# Patient Record
Sex: MALE | Race: WHITE | Employment: FULL TIME | URBAN - METROPOLITAN AREA
[De-identification: names, ages, dates, MRNs, and addresses within clinical notes are randomized per-mention and may not be internally consistent; named-entity substitution may affect disease eponyms.]

---

## 2019-04-07 ENCOUNTER — HOSPITAL ENCOUNTER (EMERGENCY)
Age: 44
Discharge: HOME | End: 2019-04-07
Attending: NURSE PRACTITIONER
Payer: COMMERCIAL

## 2019-04-07 VITALS
WEIGHT: 172.4 LBS | SYSTOLIC BLOOD PRESSURE: 132 MMHG | OXYGEN SATURATION: 97 % | HEIGHT: 69 IN | RESPIRATION RATE: 12 BRPM | TEMPERATURE: 97.5 F | HEART RATE: 78 BPM | DIASTOLIC BLOOD PRESSURE: 71 MMHG | BODY MASS INDEX: 25.53 KG/M2

## 2019-04-07 DIAGNOSIS — L02.212 CUTANEOUS ABSCESS OF BACK EXCLUDING BUTTOCKS: Primary | ICD-10-CM

## 2019-04-07 PROCEDURE — 87205 SMEAR GRAM STAIN: CPT

## 2019-04-07 PROCEDURE — 74011000250 HC RX REV CODE- 250: Performed by: NURSE PRACTITIONER

## 2019-04-07 PROCEDURE — 75810000289 HC I&D ABSCESS SIMP/COMP/MULT

## 2019-04-07 PROCEDURE — 99282 EMERGENCY DEPT VISIT SF MDM: CPT

## 2019-04-07 RX ORDER — CEPHALEXIN 500 MG/1
500 CAPSULE ORAL 4 TIMES DAILY
Qty: 28 CAP | Refills: 0 | Status: SHIPPED | OUTPATIENT
Start: 2019-04-07 | End: 2019-04-14

## 2019-04-07 RX ORDER — LIDOCAINE HYDROCHLORIDE AND EPINEPHRINE 10; 10 MG/ML; UG/ML
1.5 INJECTION, SOLUTION INFILTRATION; PERINEURAL ONCE
Status: COMPLETED | OUTPATIENT
Start: 2019-04-07 | End: 2019-04-07

## 2019-04-07 RX ADMIN — LIDOCAINE HYDROCHLORIDE,EPINEPHRINE BITARTRATE 15 MG: 10; .01 INJECTION, SOLUTION INFILTRATION; PERINEURAL at 11:12

## 2019-04-07 NOTE — ED PROVIDER NOTES
Presents with right flank abscess or \"lipoma\" that has been present for several months, but became inflamed and infected after hiking. He believes his belt is irritating the abscess and making things worse. He denies any medical history. History reviewed. No pertinent past medical history. History reviewed. No pertinent surgical history. History reviewed. No pertinent family history. Social History Socioeconomic History  Marital status:  Spouse name: Not on file  Number of children: Not on file  Years of education: Not on file  Highest education level: Not on file Occupational History  Not on file Social Needs  Financial resource strain: Not on file  Food insecurity:  
  Worry: Not on file Inability: Not on file  Transportation needs:  
  Medical: Not on file Non-medical: Not on file Tobacco Use  Smoking status: Never Smoker  Smokeless tobacco: Never Used Substance and Sexual Activity  Alcohol use: Yes Alcohol/week: 5.0 oz Types: 10 Cans of beer per week  Drug use: No  
 Sexual activity: Not on file Lifestyle  Physical activity:  
  Days per week: Not on file Minutes per session: Not on file  Stress: Not on file Relationships  Social connections:  
  Talks on phone: Not on file Gets together: Not on file Attends Rastafarian service: Not on file Active member of club or organization: Not on file Attends meetings of clubs or organizations: Not on file Relationship status: Not on file  Intimate partner violence:  
  Fear of current or ex partner: Not on file Emotionally abused: Not on file Physically abused: Not on file Forced sexual activity: Not on file Other Topics Concern  Not on file Social History Narrative  Not on file ALLERGIES: Ampicillin Review of Systems Constitutional: Negative for chills and fever. HENT: Negative. Respiratory: Negative. Cardiovascular: Negative. Skin: Positive for wound. Neurological: Negative. Vitals:  
 04/07/19 1047 BP: 132/71 Pulse: 78 Resp: 12 Temp: 97.5 °F (36.4 °C) SpO2: 97% Weight: 78.2 kg (172 lb 6.4 oz) Height: 5' 9\" (1.753 m) Physical Exam  
Constitutional: He is oriented to person, place, and time. He appears well-developed and well-nourished. No distress. HENT:  
Head: Normocephalic and atraumatic. Eyes: Pupils are equal, round, and reactive to light. EOM are normal.  
Neck: Normal range of motion. Pulmonary/Chest: Effort normal.  
Musculoskeletal: Normal range of motion. Neurological: He is alert and oriented to person, place, and time. Coordination normal.  
Skin: Skin is warm. Lesion noted. He is not diaphoretic. There is erythema. 2.5 x 2.5 tender, homogenous subcutaneous mass at the flank at the belt line. No fistula. Nursing note and vitals reviewed. MDM Number of Diagnoses or Management Options Cutaneous abscess of back excluding buttocks:  
Diagnosis management comments:  
2.5 x 2.5 inflamed mass at the right flank. Successful incision and drainage without complications. Copious amounts of sebum and mixed transudate evacuated. Most of cystic lining was completely excised. Will follow culture results. Follow up with PCP in 2 days for packing removal.  
 
  
Amount and/or Complexity of Data Reviewed Clinical lab tests: ordered Risk of Complications, Morbidity, and/or Mortality Presenting problems: minimal 
Diagnostic procedures: minimal 
Management options: minimal 
 
 
  
 
I&D Abcess Simple Date/Time: 4/7/2019 12:01 PM 
Performed by: Carly Denney NP Authorized by: Carly Denney NP Consent:  
  Consent obtained:  Verbal and written Consent given by:  Patient Risks discussed:  Bleeding, incomplete drainage, infection and pain Alternatives discussed:  No treatment and alternative treatment Location:  
  Type:  Abscess Size:  2.5 x 2.5 Location:  Trunk Trunk location:  Back Pre-procedure details:  
  Skin preparation:  Betadine Procedure type:  
  Complexity:  Simple Procedure details:  
  Incision types:  Single straight Incision depth:  Subcutaneous Scalpel blade:  11 Wound management:  Probed and deloculated and debrided Drainage:  Serosanguinous and purulent Drainage amount:  Copious Packing materials:  1/2 in gauze

## 2019-04-07 NOTE — ED TRIAGE NOTES
Patient ambulatory to ED treatment area with steady gait, for complaint of \"I have fatty area on my back and last week I noticed that I the area is red and painful. \" Denies fever at home. Unsure of injury to the area.

## 2019-04-09 LAB
BACTERIA SPEC CULT: NORMAL
GRAM STN SPEC: NORMAL
GRAM STN SPEC: NORMAL
SERVICE CMNT-IMP: NORMAL

## 2020-09-02 ENCOUNTER — HOSPITAL ENCOUNTER (INPATIENT)
Age: 45
LOS: 1 days | Discharge: HOME | DRG: 918 | End: 2020-09-04
Attending: STUDENT IN AN ORGANIZED HEALTH CARE EDUCATION/TRAINING PROGRAM | Admitting: INTERNAL MEDICINE
Payer: COMMERCIAL

## 2020-09-02 DIAGNOSIS — T63.001A TOXIC EFFECT OF SNAKE VENOM, UNINTENTIONAL, INITIAL ENCOUNTER: Primary | ICD-10-CM

## 2020-09-02 LAB
ALBUMIN SERPL-MCNC: 4.1 G/DL (ref 3.5–5)
ALBUMIN/GLOB SERPL: 1 {RATIO} (ref 1.1–2.2)
ALP SERPL-CCNC: 99 U/L (ref 45–117)
ALT SERPL-CCNC: 25 U/L (ref 12–78)
ANION GAP SERPL CALC-SCNC: 12 MMOL/L (ref 5–15)
AST SERPL-CCNC: 19 U/L (ref 15–37)
BASOPHILS # BLD: 0 K/UL (ref 0–0.1)
BASOPHILS NFR BLD: 0 % (ref 0–1)
BILIRUB SERPL-MCNC: 0.3 MG/DL (ref 0.2–1)
BUN SERPL-MCNC: 17 MG/DL (ref 6–20)
BUN/CREAT SERPL: 16 (ref 12–20)
CALCIUM SERPL-MCNC: 9.6 MG/DL (ref 8.5–10.1)
CHLORIDE SERPL-SCNC: 103 MMOL/L (ref 97–108)
CK SERPL-CCNC: 102 U/L (ref 39–308)
CO2 SERPL-SCNC: 26 MMOL/L (ref 21–32)
CREAT SERPL-MCNC: 1.04 MG/DL (ref 0.7–1.3)
DIFFERENTIAL METHOD BLD: ABNORMAL
EOSINOPHIL # BLD: 2.6 K/UL (ref 0–0.4)
EOSINOPHIL NFR BLD: 20 % (ref 0–7)
ERYTHROCYTE [DISTWIDTH] IN BLOOD BY AUTOMATED COUNT: 12.5 % (ref 11.5–14.5)
GLOBULIN SER CALC-MCNC: 4.2 G/DL (ref 2–4)
GLUCOSE SERPL-MCNC: 90 MG/DL (ref 65–100)
HCT VFR BLD AUTO: 47.7 % (ref 36.6–50.3)
HGB BLD-MCNC: 15.8 G/DL (ref 12.1–17)
IMM GRANULOCYTES # BLD AUTO: 0 K/UL
IMM GRANULOCYTES NFR BLD AUTO: 0 %
INR PPP: 1.1 (ref 0.9–1.1)
LYMPHOCYTES # BLD: 3.1 K/UL (ref 0.8–3.5)
LYMPHOCYTES NFR BLD: 24 % (ref 12–49)
MCH RBC QN AUTO: 31.3 PG (ref 26–34)
MCHC RBC AUTO-ENTMCNC: 33.1 G/DL (ref 30–36.5)
MCV RBC AUTO: 94.5 FL (ref 80–99)
MONOCYTES # BLD: 0.8 K/UL (ref 0–1)
MONOCYTES NFR BLD: 6 % (ref 5–13)
NEUTS SEG # BLD: 6.3 K/UL (ref 1.8–8)
NEUTS SEG NFR BLD: 50 % (ref 32–75)
NRBC # BLD: 0 K/UL (ref 0–0.01)
NRBC BLD-RTO: 0 PER 100 WBC
PLATELET # BLD AUTO: 316 K/UL (ref 150–400)
PLATELET COMMENTS,PCOM: ABNORMAL
PMV BLD AUTO: 10.2 FL (ref 8.9–12.9)
POTASSIUM SERPL-SCNC: 4 MMOL/L (ref 3.5–5.1)
PROT SERPL-MCNC: 8.3 G/DL (ref 6.4–8.2)
PROTHROMBIN TIME: 10.4 SEC (ref 9–11.1)
RBC # BLD AUTO: 5.05 M/UL (ref 4.1–5.7)
RBC MORPH BLD: ABNORMAL
SODIUM SERPL-SCNC: 141 MMOL/L (ref 136–145)
WBC # BLD AUTO: 12.8 K/UL (ref 4.1–11.1)
WBC MORPH BLD: ABNORMAL

## 2020-09-02 PROCEDURE — 85610 PROTHROMBIN TIME: CPT

## 2020-09-02 PROCEDURE — 93005 ELECTROCARDIOGRAM TRACING: CPT

## 2020-09-02 PROCEDURE — 85025 COMPLETE CBC W/AUTO DIFF WBC: CPT

## 2020-09-02 PROCEDURE — 82550 ASSAY OF CK (CPK): CPT

## 2020-09-02 PROCEDURE — 85384 FIBRINOGEN ACTIVITY: CPT

## 2020-09-02 PROCEDURE — 80053 COMPREHEN METABOLIC PANEL: CPT

## 2020-09-02 PROCEDURE — 90715 TDAP VACCINE 7 YRS/> IM: CPT | Performed by: STUDENT IN AN ORGANIZED HEALTH CARE EDUCATION/TRAINING PROGRAM

## 2020-09-02 PROCEDURE — 74011250636 HC RX REV CODE- 250/636: Performed by: STUDENT IN AN ORGANIZED HEALTH CARE EDUCATION/TRAINING PROGRAM

## 2020-09-02 PROCEDURE — 36415 COLL VENOUS BLD VENIPUNCTURE: CPT

## 2020-09-02 RX ORDER — MORPHINE SULFATE 4 MG/ML
6 INJECTION INTRAVENOUS ONCE
Status: COMPLETED | OUTPATIENT
Start: 2020-09-02 | End: 2020-09-02

## 2020-09-02 RX ORDER — MORPHINE SULFATE 2 MG/ML
4 INJECTION, SOLUTION INTRAMUSCULAR; INTRAVENOUS ONCE
Status: COMPLETED | OUTPATIENT
Start: 2020-09-02 | End: 2020-09-02

## 2020-09-02 RX ORDER — DIPHENHYDRAMINE HYDROCHLORIDE 50 MG/ML
50 INJECTION, SOLUTION INTRAMUSCULAR; INTRAVENOUS ONCE
Status: COMPLETED | OUTPATIENT
Start: 2020-09-02 | End: 2020-09-02

## 2020-09-02 RX ADMIN — MORPHINE SULFATE 6 MG: 4 INJECTION INTRAVENOUS at 23:11

## 2020-09-02 RX ADMIN — MORPHINE SULFATE 4 MG: 2 INJECTION, SOLUTION INTRAMUSCULAR; INTRAVENOUS at 23:45

## 2020-09-02 RX ADMIN — OVINE CROTALIDAE VENOMS IMMUNE FAB 6 VIAL: 1; 1; 1; 1 INJECTION, POWDER, LYOPHILIZED, FOR SOLUTION INTRAVENOUS at 23:33

## 2020-09-02 RX ADMIN — SODIUM CHLORIDE, SODIUM LACTATE, POTASSIUM CHLORIDE, AND CALCIUM CHLORIDE 1000 ML: 600; 310; 30; 20 INJECTION, SOLUTION INTRAVENOUS at 23:35

## 2020-09-02 RX ADMIN — DIPHENHYDRAMINE HYDROCHLORIDE 50 MG: 50 INJECTION, SOLUTION INTRAMUSCULAR; INTRAVENOUS at 23:43

## 2020-09-02 RX ADMIN — TETANUS TOXOID, REDUCED DIPHTHERIA TOXOID AND ACELLULAR PERTUSSIS VACCINE, ADSORBED 0.5 ML: 5; 2.5; 8; 8; 2.5 SUSPENSION INTRAMUSCULAR at 23:53
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
## 2020-09-03 LAB
ANION GAP SERPL CALC-SCNC: 5 MMOL/L (ref 5–15)
BASOPHILS # BLD: 0 K/UL (ref 0–0.1)
BASOPHILS # BLD: 0 K/UL (ref 0–0.1)
BASOPHILS NFR BLD: 0 % (ref 0–1)
BASOPHILS NFR BLD: 0 % (ref 0–1)
BUN SERPL-MCNC: 17 MG/DL (ref 6–20)
BUN/CREAT SERPL: 19 (ref 12–20)
CALCIUM SERPL-MCNC: 8.6 MG/DL (ref 8.5–10.1)
CHLORIDE SERPL-SCNC: 107 MMOL/L (ref 97–108)
CO2 SERPL-SCNC: 25 MMOL/L (ref 21–32)
CREAT SERPL-MCNC: 0.91 MG/DL (ref 0.7–1.3)
DIFFERENTIAL METHOD BLD: ABNORMAL
DIFFERENTIAL METHOD BLD: ABNORMAL
EOSINOPHIL # BLD: 0 K/UL (ref 0–0.4)
EOSINOPHIL # BLD: 0.4 K/UL (ref 0–0.4)
EOSINOPHIL NFR BLD: 0 % (ref 0–7)
EOSINOPHIL NFR BLD: 4 % (ref 0–7)
ERYTHROCYTE [DISTWIDTH] IN BLOOD BY AUTOMATED COUNT: 12.4 % (ref 11.5–14.5)
ERYTHROCYTE [DISTWIDTH] IN BLOOD BY AUTOMATED COUNT: 12.5 % (ref 11.5–14.5)
FIBRINOGEN PPP-MCNC: 346 MG/DL (ref 200–475)
GLUCOSE SERPL-MCNC: 127 MG/DL (ref 65–100)
HCT VFR BLD AUTO: 46.1 % (ref 36.6–50.3)
HCT VFR BLD AUTO: 48.1 % (ref 36.6–50.3)
HGB BLD-MCNC: 15.7 G/DL (ref 12.1–17)
HGB BLD-MCNC: 16.2 G/DL (ref 12.1–17)
IMM GRANULOCYTES # BLD AUTO: 0 K/UL
IMM GRANULOCYTES # BLD AUTO: 0.1 K/UL (ref 0–0.04)
IMM GRANULOCYTES NFR BLD AUTO: 0 %
IMM GRANULOCYTES NFR BLD AUTO: 1 % (ref 0–0.5)
INR PPP: 1.2 (ref 0.9–1.1)
LYMPHOCYTES # BLD: 0.5 K/UL (ref 0.8–3.5)
LYMPHOCYTES # BLD: 2.8 K/UL (ref 0.8–3.5)
LYMPHOCYTES NFR BLD: 26 % (ref 12–49)
LYMPHOCYTES NFR BLD: 3 % (ref 12–49)
MCH RBC QN AUTO: 31.5 PG (ref 26–34)
MCH RBC QN AUTO: 32 PG (ref 26–34)
MCHC RBC AUTO-ENTMCNC: 33.7 G/DL (ref 30–36.5)
MCHC RBC AUTO-ENTMCNC: 34.1 G/DL (ref 30–36.5)
MCV RBC AUTO: 93.4 FL (ref 80–99)
MCV RBC AUTO: 93.9 FL (ref 80–99)
MONOCYTES # BLD: 0.3 K/UL (ref 0–1)
MONOCYTES # BLD: 0.5 K/UL (ref 0–1)
MONOCYTES NFR BLD: 2 % (ref 5–13)
MONOCYTES NFR BLD: 5 % (ref 5–13)
NEUTS BAND NFR BLD MANUAL: 9 % (ref 0–6)
NEUTS SEG # BLD: 15.3 K/UL (ref 1.8–8)
NEUTS SEG # BLD: 7.2 K/UL (ref 1.8–8)
NEUTS SEG NFR BLD: 65 % (ref 32–75)
NEUTS SEG NFR BLD: 86 % (ref 32–75)
NRBC # BLD: 0 K/UL (ref 0–0.01)
NRBC # BLD: 0 K/UL (ref 0–0.01)
NRBC BLD-RTO: 0 PER 100 WBC
NRBC BLD-RTO: 0 PER 100 WBC
PLATELET # BLD AUTO: 250 K/UL (ref 150–400)
PLATELET # BLD AUTO: 299 K/UL (ref 150–400)
PMV BLD AUTO: 10 FL (ref 8.9–12.9)
PMV BLD AUTO: 9.6 FL (ref 8.9–12.9)
POTASSIUM SERPL-SCNC: 4.4 MMOL/L (ref 3.5–5.1)
PROTHROMBIN TIME: 11.6 SEC (ref 9–11.1)
RBC # BLD AUTO: 4.91 M/UL (ref 4.1–5.7)
RBC # BLD AUTO: 5.15 M/UL (ref 4.1–5.7)
RBC MORPH BLD: ABNORMAL
SODIUM SERPL-SCNC: 137 MMOL/L (ref 136–145)
WBC # BLD AUTO: 11.1 K/UL (ref 4.1–11.1)
WBC # BLD AUTO: 16.1 K/UL (ref 4.1–11.1)

## 2020-09-03 PROCEDURE — 36415 COLL VENOUS BLD VENIPUNCTURE: CPT

## 2020-09-03 PROCEDURE — 74011000250 HC RX REV CODE- 250: Performed by: STUDENT IN AN ORGANIZED HEALTH CARE EDUCATION/TRAINING PROGRAM

## 2020-09-03 PROCEDURE — 96365 THER/PROPH/DIAG IV INF INIT: CPT

## 2020-09-03 PROCEDURE — 85025 COMPLETE CBC W/AUTO DIFF WBC: CPT

## 2020-09-03 PROCEDURE — 74011250636 HC RX REV CODE- 250/636: Performed by: STUDENT IN AN ORGANIZED HEALTH CARE EDUCATION/TRAINING PROGRAM

## 2020-09-03 PROCEDURE — 96361 HYDRATE IV INFUSION ADD-ON: CPT

## 2020-09-03 PROCEDURE — 96374 THER/PROPH/DIAG INJ IV PUSH: CPT

## 2020-09-03 PROCEDURE — 96375 TX/PRO/DX INJ NEW DRUG ADDON: CPT

## 2020-09-03 PROCEDURE — 74011250636 HC RX REV CODE- 250/636

## 2020-09-03 PROCEDURE — 74011250636 HC RX REV CODE- 250/636: Performed by: INTERNAL MEDICINE

## 2020-09-03 PROCEDURE — 65270000029 HC RM PRIVATE

## 2020-09-03 PROCEDURE — 74011250637 HC RX REV CODE- 250/637: Performed by: INTERNAL MEDICINE

## 2020-09-03 PROCEDURE — 74011000258 HC RX REV CODE- 258: Performed by: INTERNAL MEDICINE

## 2020-09-03 PROCEDURE — 99285 EMERGENCY DEPT VISIT HI MDM: CPT

## 2020-09-03 PROCEDURE — 85610 PROTHROMBIN TIME: CPT

## 2020-09-03 PROCEDURE — 90471 IMMUNIZATION ADMIN: CPT

## 2020-09-03 PROCEDURE — 96376 TX/PRO/DX INJ SAME DRUG ADON: CPT

## 2020-09-03 PROCEDURE — 74011250636 HC RX REV CODE- 250/636: Performed by: EMERGENCY MEDICINE

## 2020-09-03 PROCEDURE — 80048 BASIC METABOLIC PNL TOTAL CA: CPT

## 2020-09-03 RX ORDER — DIPHENHYDRAMINE HYDROCHLORIDE 50 MG/ML
50 INJECTION, SOLUTION INTRAMUSCULAR; INTRAVENOUS
Status: DISCONTINUED | OUTPATIENT
Start: 2020-09-03 | End: 2020-09-04 | Stop reason: HOSPADM

## 2020-09-03 RX ORDER — DEXTROSE MONOHYDRATE AND SODIUM CHLORIDE 5; .45 G/100ML; G/100ML
75 INJECTION, SOLUTION INTRAVENOUS CONTINUOUS
Status: DISCONTINUED | OUTPATIENT
Start: 2020-09-03 | End: 2020-09-03

## 2020-09-03 RX ORDER — MORPHINE SULFATE 2 MG/ML
2 INJECTION, SOLUTION INTRAMUSCULAR; INTRAVENOUS
Status: DISCONTINUED | OUTPATIENT
Start: 2020-09-03 | End: 2020-09-03

## 2020-09-03 RX ORDER — SODIUM CHLORIDE 0.9 % (FLUSH) 0.9 %
5-40 SYRINGE (ML) INJECTION AS NEEDED
Status: DISCONTINUED | OUTPATIENT
Start: 2020-09-03 | End: 2020-09-04 | Stop reason: HOSPADM

## 2020-09-03 RX ORDER — EPINEPHRINE 1 MG/ML
INJECTION, SOLUTION, CONCENTRATE INTRAVENOUS
Status: COMPLETED
Start: 2020-09-03 | End: 2020-09-03

## 2020-09-03 RX ORDER — SODIUM CHLORIDE 0.9 % (FLUSH) 0.9 %
5-40 SYRINGE (ML) INJECTION EVERY 8 HOURS
Status: DISCONTINUED | OUTPATIENT
Start: 2020-09-03 | End: 2020-09-04 | Stop reason: HOSPADM

## 2020-09-03 RX ORDER — ACETAMINOPHEN 325 MG/1
650 TABLET ORAL
Status: DISCONTINUED | OUTPATIENT
Start: 2020-09-03 | End: 2020-09-04 | Stop reason: HOSPADM

## 2020-09-03 RX ORDER — ZOLPIDEM TARTRATE 5 MG/1
5 TABLET ORAL
Status: DISCONTINUED | OUTPATIENT
Start: 2020-09-03 | End: 2020-09-04 | Stop reason: HOSPADM

## 2020-09-03 RX ORDER — CLINDAMYCIN PHOSPHATE 10 MG/G
GEL TOPICAL DAILY
COMMUNITY

## 2020-09-03 RX ORDER — EPINEPHRINE 1 MG/ML
0.5 INJECTION, SOLUTION, CONCENTRATE INTRAVENOUS
Status: COMPLETED | OUTPATIENT
Start: 2020-09-03 | End: 2020-09-03

## 2020-09-03 RX ORDER — PROMETHAZINE HYDROCHLORIDE 25 MG/1
12.5 TABLET ORAL
Status: DISCONTINUED | OUTPATIENT
Start: 2020-09-03 | End: 2020-09-04 | Stop reason: HOSPADM

## 2020-09-03 RX ORDER — HYDROMORPHONE HYDROCHLORIDE 1 MG/ML
1 INJECTION, SOLUTION INTRAMUSCULAR; INTRAVENOUS; SUBCUTANEOUS
Status: COMPLETED | OUTPATIENT
Start: 2020-09-03 | End: 2020-09-03

## 2020-09-03 RX ORDER — ONDANSETRON 2 MG/ML
4 INJECTION INTRAMUSCULAR; INTRAVENOUS
Status: DISCONTINUED | OUTPATIENT
Start: 2020-09-03 | End: 2020-09-04 | Stop reason: HOSPADM

## 2020-09-03 RX ORDER — POLYETHYLENE GLYCOL 3350 17 G/17G
17 POWDER, FOR SOLUTION ORAL DAILY PRN
Status: DISCONTINUED | OUTPATIENT
Start: 2020-09-03 | End: 2020-09-04 | Stop reason: HOSPADM

## 2020-09-03 RX ORDER — HYDROMORPHONE HYDROCHLORIDE 2 MG/ML
2 INJECTION, SOLUTION INTRAMUSCULAR; INTRAVENOUS; SUBCUTANEOUS
Status: DISCONTINUED | OUTPATIENT
Start: 2020-09-03 | End: 2020-09-04 | Stop reason: HOSPADM

## 2020-09-03 RX ORDER — ACETAMINOPHEN 650 MG/1
650 SUPPOSITORY RECTAL
Status: DISCONTINUED | OUTPATIENT
Start: 2020-09-03 | End: 2020-09-04 | Stop reason: HOSPADM

## 2020-09-03 RX ADMIN — DEXTROSE MONOHYDRATE AND SODIUM CHLORIDE 75 ML/HR: 5; .45 INJECTION, SOLUTION INTRAVENOUS at 02:59

## 2020-09-03 RX ADMIN — Medication 10 ML: at 06:33

## 2020-09-03 RX ADMIN — ZOLPIDEM TARTRATE 5 MG: 5 TABLET ORAL at 23:59

## 2020-09-03 RX ADMIN — EPINEPHRINE 1 MG: 1 INJECTION, SOLUTION, CONCENTRATE INTRAVENOUS at 00:39

## 2020-09-03 RX ADMIN — Medication 40 ML: at 11:00

## 2020-09-03 RX ADMIN — HYDROMORPHONE HYDROCHLORIDE 2 MG: 2 INJECTION, SOLUTION INTRAMUSCULAR; INTRAVENOUS; SUBCUTANEOUS at 06:33

## 2020-09-03 RX ADMIN — METHYLPREDNISOLONE SODIUM SUCCINATE 125 MG: 125 INJECTION, POWDER, FOR SOLUTION INTRAMUSCULAR; INTRAVENOUS at 00:49

## 2020-09-03 RX ADMIN — FAMOTIDINE 20 MG: 10 INJECTION, SOLUTION INTRAVENOUS at 00:30

## 2020-09-03 RX ADMIN — FAMOTIDINE 20 MG: 10 INJECTION, SOLUTION INTRAVENOUS at 11:00

## 2020-09-03 RX ADMIN — HYDROMORPHONE HYDROCHLORIDE 1 MG: 1 INJECTION, SOLUTION INTRAMUSCULAR; INTRAVENOUS; SUBCUTANEOUS at 02:20

## 2020-09-03 RX ADMIN — Medication 10 ML: at 06:34

## 2020-09-03 NOTE — CONSULTS
ORTHO CONSULT    Subjective:     Date of Consultation:  September 3, 2020    Referring Physician:  Dr. Sally Rae is a 53 y.o. male we are consulted to see for R second toe copperhead bite with edema. Pt. Overnight given crofab and had allergic like reaction. Swelling is better this AM. Has noted worse swelling when getting up this AM with the leg dependent. Pain controlled with elevation. Vitals stable. Patient Active Problem List    Diagnosis Date Noted    Snake bite 09/03/2020     History reviewed. No pertinent family history. Denies family history      Social History     Tobacco Use    Smoking status: Never Smoker    Smokeless tobacco: Never Used   Substance Use Topics    Alcohol use: Yes     Alcohol/week: 8.3 standard drinks     Types: 10 Cans of beer per week     Past Medical History:   Diagnosis Date    Kidney stones       Past Surgical History:   Procedure Laterality Date    HX HEMORRHOIDECTOMY        Prior to Admission medications    Not on File     Current Facility-Administered Medications   Medication Dose Route Frequency    famotidine (PF) (PEPCID) 20 mg in 0.9% sodium chloride 10 mL injection  20 mg IntraVENous Q12H    sodium chloride (NS) flush 5-40 mL  5-40 mL IntraVENous Q8H    sodium chloride (NS) flush 5-40 mL  5-40 mL IntraVENous PRN    acetaminophen (TYLENOL) tablet 650 mg  650 mg Oral Q6H PRN    Or    acetaminophen (TYLENOL) suppository 650 mg  650 mg Rectal Q6H PRN    polyethylene glycol (MIRALAX) packet 17 g  17 g Oral DAILY PRN    promethazine (PHENERGAN) tablet 12.5 mg  12.5 mg Oral Q6H PRN    Or    ondansetron (ZOFRAN) injection 4 mg  4 mg IntraVENous Q6H PRN    dextrose 5 % - 0.45% NaCl infusion  75 mL/hr IntraVENous CONTINUOUS    HYDROmorphone (PF) (DILAUDID) injection 2 mg  2 mg IntraVENous Q4H PRN    diphenhydrAMINE (BENADRYL) injection 50 mg  50 mg IntraVENous Q6H PRN     No current outpatient medications on file.      Allergies   Allergen Reactions    Crofab [Crotalidae Polyval Immune Rambo] Anaphylaxis    Ampicillin Rash        Review of Systems: Review of Systems:  Denies fever/chills/headache/blurry vision/loss of hearing/trouble swallowing/chest pain/ shortness of breath/abdominal pain/ extremity numbness or weakness/ skin issues or any other complaints rest of 10 system review is negative. Objective:     Visit Vitals  /76   Pulse 80   Temp 98 °F (36.7 °C)   Resp 23   Ht 5' 9\" (1.753 m)   Wt 82.1 kg (181 lb)   SpO2 95%   BMI 26.73 kg/m²       EXAM:   Gen: Well-developed,  in no acute distress, alert and oriented x 3  HEENT: NCAT, PERRLA  CARDS: Regular rate and rhythm  RESP: Nonlaborred breathing, no use of accessory muscles  Abdomen: NT ND soft, no peritoneal signs  SKIN: Intact     MSK: R foot. Minimal edema of the R 2nd toe and foot to ankle level. Purple appearance of the R 2nd toe slightly improved from reported last night. Motor/sensory intact. Cap refill <3 secs all toes. DP pulses = BLE's. Compartments of foot and leg soft and compressible. No signs of skin necorsis or toe compromise. XR Results (most recent):  No results found for this or any previous visit. Assessment/Plan:     Encounter Diagnoses     ICD-10-CM ICD-9-CM   1. Toxic effect of snake venom, unintentional, initial encounter  T63.001A 989.5     E905.0     No surgical intervention needed at this time. Defer rest of management to medical team. We will continue to follow when in house. Keep R foot elevated above the heart. May require post op shoe or boot for comfort and possible crutches to offload leg and foot. Lorenzo Christine MD    [facility] [place] [facility].  Marcin Desouza MD  [facility] [phone]  Medical Staff : Edinson Paul/ [address] [facility]  Office : [phone]

## 2020-09-03 NOTE — ED PROVIDER NOTES
40-year-old gentleman presenting with snakebite to his right second toe at approximately 10:30 PM.  Patient was outside of his home wearing flip-flops and felt acute pain, did not get a good look at the snake but had recently killed a copperhead on his property. Noticed immediate pain to this area which was spreading proximally into the foot over the course on his travel towards the emergency department. Denies nausea vomiting, lightheadedness, tingling or numbness of his extremities other than some tingling over his right hand. Pain is severe and sharp in nature. No history of similar symptoms. No allergies. Past Medical History:   Diagnosis Date    Kidney stones        Past Surgical History:   Procedure Laterality Date    HX HEMORRHOIDECTOMY           History reviewed. No pertinent family history. Social History     Socioeconomic History    Marital status:      Spouse name: Not on file    Number of children: Not on file    Years of education: Not on file    Highest education level: Not on file   Occupational History    Not on file   Social Needs    Financial resource strain: Not on file    Food insecurity     Worry: Not on file     Inability: Not on file    Transportation needs     Medical: Not on file     Non-medical: Not on file   Tobacco Use    Smoking status: Never Smoker    Smokeless tobacco: Never Used   Substance and Sexual Activity    Alcohol use:  Yes     Alcohol/week: 8.3 standard drinks     Types: 10 Cans of beer per week    Drug use: No    Sexual activity: Not on file   Lifestyle    Physical activity     Days per week: Not on file     Minutes per session: Not on file    Stress: Not on file   Relationships    Social connections     Talks on phone: Not on file     Gets together: Not on file     Attends Advent service: Not on file     Active member of club or organization: Not on file     Attends meetings of clubs or organizations: Not on file     Relationship status: Not on file    Intimate partner violence     Fear of current or ex partner: Not on file     Emotionally abused: Not on file     Physically abused: Not on file     Forced sexual activity: Not on file   Other Topics Concern    Not on file   Social History Narrative    Not on file         ALLERGIES: Ampicillin    Review of Systems   Constitutional: Negative for chills, fatigue and fever. HENT: Negative for ear pain, sore throat and trouble swallowing. Eyes: Negative for visual disturbance. Respiratory: Negative for cough and shortness of breath. Cardiovascular: Negative for chest pain. Gastrointestinal: Negative for abdominal pain. Genitourinary: Negative for dysuria. Musculoskeletal: Negative for back pain. Skin: Negative for rash. Neurological: Positive for weakness. Negative for light-headedness and headaches. Psychiatric/Behavioral: Negative for confusion. All other systems reviewed and are negative. Vitals:    09/02/20 2300 09/02/20 2315 09/02/20 2330 09/02/20 2345   BP: 157/61 147/80 147/71 158/83   Pulse: (!) 105 (!) 102 93 92   Resp: 28      Temp: 98 °F (36.7 °C)      SpO2: 98% 98% 98% 100%   Weight: 82.1 kg (181 lb)      Height: 5' 9\" (1.753 m)               Physical Exam  Vitals signs reviewed. Constitutional:       General: He is not in acute distress. HENT:      Head: Normocephalic and atraumatic. Mouth/Throat:      Mouth: Mucous membranes are moist.      Pharynx: Oropharynx is clear. Cardiovascular:      Rate and Rhythm: Normal rate and regular rhythm. Heart sounds: Normal heart sounds. Pulmonary:      Effort: Pulmonary effort is normal.      Breath sounds: Normal breath sounds. Abdominal:      Tenderness: There is no abdominal tenderness. There is no guarding or rebound. Musculoskeletal: Normal range of motion. Left lower leg: Edema present. Feet:    Skin:     General: Skin is warm and dry.       Capillary Refill: Capillary refill takes less than 2 seconds. Neurological:      General: No focal deficit present. Mental Status: He is alert and oriented to person, place, and time. Psychiatric:         Mood and Affect: Mood normal.          MDM  Number of Diagnoses or Management Options  Toxic effect of snake venom, unintentional, initial encounter:     ED Course as of Sep 03 0303   Thu Sep 03, 2020   0048 12:20 AM, was made aware that the patient was having an anaphylactic reaction, generalized urticaria, some nausea and acid reflux. Blood pressure stable, mildly tachycardic. Given IM epi. [NS]   9818 Patient doing better now, discussed with Dr. Princess Gamble of the hospitalist group, agrees to ICU admission. Also discussed with Dr. Jennie Cheung in ED    [NS]   0104 Patient was reevaluated, feeling better, heart rate is normalized. Still having some urticaria. [NS]      ED Course User Index  [NS] Jenise Robb MD       Procedures      Perfect Serve Consult for Admission  12:07 AM    ED Room Number: WER03/03  Patient Name and age:  Sarah Main 53 y.o.  male  Working Diagnosis:   1. Toxic effect of snake venom, unintentional, initial encounter        COVID-19 Suspicion:  no  Sepsis present:  no  Reassessment needed: no  Code Status:  Full Code  Readmission: no  Isolation Requirements:  no  Recommended Level of Care:  ICU  Department:[place] ED - [phone]  Other:       Patient presenting with progressive local symptoms from acute presentation of snakebite. Worsening swelling.  No neurological symptoms, generalized symptoms.  Blood pressure stable   Orthopedics consult -- evaluated at the bedside 1130pm   Given significant swelling started on 6 vials of CroFab infusion, morphine for pain, IV fluids, elevation immediately.    With benefit from monitoring in the ICU, possibly may need further administration of antivenom if progressive symptoms         12:07 AM  I have spent 65 minutes of critical care time involved in lab review, consultations with specialist, family decision-making, and documentation. During this entire length of time I was immediately available to the patient and/or family.

## 2020-09-03 NOTE — ED NOTES
Informed Dr Griffin Fisher that pt has some heartburn and some pressure in his ears no other complaints, Crofab continues to infuse

## 2020-09-03 NOTE — ED NOTES
TRANSFER - OUT REPORT:    Verbal report given to [facility] RN(name) on Mario Cain  being transferred to [facility] ED ICU HOLD(unit) for routine progression of care       Report consisted of patients Situation, Background, Assessment and   Recommendations(SBAR). Information from the following report(s) ED Summary, MAR and Recent Results was reviewed with the receiving nurse. Lines:   Peripheral IV 09/02/20 Left Antecubital (Active)       Peripheral IV 09/02/20 Right Forearm (Active)   Site Assessment Clean, dry, & intact 09/02/20 2342   Phlebitis Assessment 0 09/02/20 2342   Infiltration Assessment 0 09/02/20 2342   Dressing Type Transparent 09/02/20 2342   Hub Color/Line Status Flushed 09/02/20 2342        Opportunity for questions and clarification was provided.       Patient transported with:   Monitor, IV saline lock right forearm/left A/C

## 2020-09-03 NOTE — ED NOTES
Called Poison control informed them of the pt they instructed repeat CBC and PT/INR one hour after the crofab is completed. Informed Dr Lesly Smith.

## 2020-09-03 NOTE — PROGRESS NOTES
Swelling improving. Discussed with Poison Control. Recommended monitoring for 24 hours post CroFab, especially after allergic reaction. Plan to DC tomorrow morning if pt continues to improve.

## 2020-09-03 NOTE — ED NOTES
Spoke with [employer]. Was advised to do next set of measurements at 0600. No new labs needed and will be contacted by poison control after 0600 for results.

## 2020-09-03 NOTE — H&P
[facility] [place] [address]  HISTORY AND PHYSICAL    Name:  Carmencita Gutierres  MR#:  [id number]  :  1975  ACCOUNT #:  [de-identified]    DATE OF SERVICE:  2020    PRIMARY CARE PHYSICIAN:  None. CHIEF COMPLAINT:  Snake bite to right second toe. HISTORY OF PRESENT ILLNESS: 68-year-old gentleman without any significant past medical history except for some nephrolithiasis, was initially brought to the [facility] Emergency Room for a presumed copperhead snakebite to the right second toe. Patient stated that whilst being outside of his home around 10:30 p.m. on 2020, felt a bite and  sudden pain to his right second toe. Patient stated that he was almost certain it was a snake, as he had recently killed a copperhead snake around his residence. Thereafter,  patient noted worsening pain and increased swelling spreading to the right foot region. Patient denied any associated headaches, dizziness, visual deficits, chest pain, palpitations, sore throat, cough, nausea, vomiting, fevers, chills. PAST MEDICAL HISTORY:  Nephrolithiasis. PAST SURGICAL HISTORY:  Hemorrhoidectomy. HOME MEDICATIONS:  Nil of note. ALLERGIES:  1. AMPICILLIN. 2.  POSSIBLE CROFAB. SOCIAL HISTORY:  Smokes a few cigarettes occasionally. Denied alcohol use disorder. Denied any recent travels or known sick contacts. FAMILY HISTORY:  Reviewed and positive for mother with skin cancer. The patient works in the construction industry. REVIEW OF SYSTEMS:  A complete system review conducted essentially negative, otherwise as outlined in the history of the presenting illness. PHYSICAL EXAMINATION:  GENERAL:  No acute distress. VITAL SIGNS:  Blood pressure 139/100, heart rate 91, respiratory rate 28, afebrile, saturating 98% on room air. HEENT:  Head exam, normocephalic. Pupils equal and reactive to light without any nystagmus. ENT exam, ear, nose, throat clear.   CARDIOVASCULAR:  S1, S2 present without any detectable murmurs. RESPIRATORY:  Lungs clear bilaterally. GI:  Bowel sounds present. The abdomen is soft, nontender. No rebound, no guarding. GENITOURINARY:  No suprapubic or flank tenderness. MUSCULOSKELETAL:  No asymmetry of the extremities. Swelling, tenderness, and warmth noted to the right foot, present on admission. CNS: Awake; alert; oriented to time, date, place, person. LABORATORY/RADIOGRAPHIC FINDINGS:      12-lead EKG with normal sinus rhythm at 87 per minute. CBC with a WBC of 12.8, 50% polys, hemoglobin 15.8, hematocrit 47.7, platelet count 669. Metabolic panel with a sodium of 141, potassium 4, chloride 103, bicarb 26, BUN of 17, creatinine 1.04, glucose of 90, calcium 9.6 with an albumin of 4.1. ALT of 25, AST of 19, alkaline phosphatase of 99. CK of 102. Coagulation profile with an INR of 1.1, prothrombin time 10.4. ASSESSMENT/PLAN:  1. Toxicology:  Status post CroFab for acute presumed copperhead snakebite to the right second toe. Possible anaphylactic reaction to CroFab therapy. Close neurovascular checks. IV hydration, as-needed steroids, Pepcid and Benadryl. For followup labs. 2.  Cardiovascular:  Probable uncontrolled primary hypertension worsened by  severe right toe pain. Will aim to optimize blood pressure control with as-needed IV hydralazine. 3.  Analgesia: Will aim to optimize pain control with judicious use of opiates. 4.  Prophylaxis for deep vein thrombosis. 5.  Directives:  Full code with a guarded prognosis. Discussed with patient.     In summary, a 68-year-old gentleman without any significant past medical history except nephrolithiasis, is being admitted to the inpatient level for acute presumed copperhead snakebite to the right second toe with associated severe pain and right foot swelling with a possible anaphylactic reaction to infusion of CroFab and necessitating ongoing close monitoring and management including with neurovascular checks, IV hydration, frequent hemodynamics, and followup labs. The entire admission plan discussed in detail with patient. All questions and concerns were addressed. Patient's functional status prior to admission significant for  patient being able to ambulate unassisted. Total time for admission 40 minutes, of which at least 50% of the time was spent in plan of care and counseling of  patient.       MD LIZETTE Martini/[id number]  D:  09/03/2020 3:05  T:  09/03/2020 4:49  JOB #:  [id number]

## 2020-09-03 NOTE — ED NOTES
Hives to arms and torso decreasing pt states that pressure in his lower chest is feeling better, pt no longer shaking.   Informed Dr Greg Barrett

## 2020-09-03 NOTE — ED TRIAGE NOTES
Pt assisted to treatment area he states that 15 mins PTA he was going out the side door and was bit by a copperhead on the right 2nd toe. He has 2 punctures to the toe with some swelling and pain that goes up the foot. Not swelling into the foot at this time.   Dr Babak Marroquin at the bedside

## 2020-09-03 NOTE — ED NOTES
TRANSFER - OUT REPORT:    Bedside Verbal report given to Rakan WILSON RN(name) on Yvrose Almanza  being transferred to ICU (unit) for routine progression of care       Report consisted of patients Situation, Background, Assessment and   Recommendations(SBAR). Information from the following report(s) SBAR, Kardex and ED Summary was reviewed with the receiving nurse. Lines:   Peripheral IV 09/02/20 Left Antecubital (Active)       Peripheral IV 09/02/20 Right Forearm (Active)   Site Assessment Clean, dry, & intact 09/02/20 2342   Phlebitis Assessment 0 09/02/20 2342   Infiltration Assessment 0 09/02/20 2342   Dressing Type Transparent 09/02/20 2342   Hub Color/Line Status Flushed 09/02/20 2342        Opportunity for questions and clarification was provided.       Patient transported with:   Monitor  Registered Nurse

## 2020-09-03 NOTE — PROGRESS NOTES
Reason for Admission:   Snake bite to right toe,presumably a copperhead                   RUR Score:       7%              Plan for utilizing home health: There are no identified home health needs @ this time    PCP: First and Last name:  No PCP   Name of Practice:    Are you a current patient: Yes/No:    Approximate date of last visit:    Can you participate in a virtual visit with your PCP: yes                    Current Advanced Directive/Advance Care Plan: full code,no AMD                         Transition of Care Plan:                    Pt was admitted to [facility] for a snake bite to his right toe and after having a possible allergic reaction to Crofab. Pt lives with his wife ,Josiane Farrar @ the address on demographics. Angela's number is [phone]. Pt has aetna the patient works for [employer]. Pt states he does not need a work excuse when discharged,  Pt states he goes to [employer] and [employer]. Pt states he prefers to make his own follow-up appointment when discharged which will likely be tomorrow am.  Pt does not meet criteria for home health or rehab. I will follow-up in the morning regarding discharge needs but @ this time,there are no identified needs.     [address] [facility]

## 2020-09-03 NOTE — PROGRESS NOTES
1387: Bedside report from [place], [address] [place] [address]. Per ED RN the last required set of distal measurements of the right lower extremity were completed at 0600. The \"[facility]\" work sheet was reviewed and the 0600 were completed. No serial labs ordered at this time. 7101: No distress. Vitals stable. Alert and oriented times four. 1000: Client resting comfortably. No distress. Vitals stable. Wife in room at this time. 1200: Client sleeping. Vitals stable. No distress. 1400: Spouse to room. Provided food from outside source. No distress. 1600: Client sleeping. No distress. Client woke and stated he was up in the room at 1500 and states his right lower extremity was sore but he was easily able to bare weight. 1830: Client requested medication to help him sleep. Dr. George Webster to order Ambien. 1900: Report to [facility], RN.

## 2020-09-03 NOTE — ED NOTES
Informed Dr Therese Amaro that pt has hives and feels pressure across his chest Crofab stopped, Dr Therese Amaro to the bedside

## 2020-09-03 NOTE — CONSULTS
ORTHO CONSULT    Subjective:     Date of Consultation:  September 3, 2020    Referring Physician:  Dr. Clarissa Garcia is a 53 y.o. male we are consulted to see for R toe copperhead bite with edema. Pt. States bitten by copperhead 1 hr ago. C/o diffuse swelling. There are no active problems to display for this patient. History reviewed. No pertinent family history. Social History     Tobacco Use    Smoking status: Never Smoker    Smokeless tobacco: Never Used   Substance Use Topics    Alcohol use: Yes     Alcohol/week: 8.3 standard drinks     Types: 10 Cans of beer per week     Past Medical History:   Diagnosis Date    Kidney stones       Past Surgical History:   Procedure Laterality Date    HX HEMORRHOIDECTOMY        Prior to Admission medications    Not on File     No current facility-administered medications for this encounter. No current outpatient medications on file. Allergies   Allergen Reactions    Ampicillin Rash        Review of Systems:  A comprehensive review of systems was negative except for that written in the HPI. Objective:     Visit Vitals  /83   Pulse 92   Temp 98 °F (36.7 °C)   Resp 28   Ht 5' 9\" (1.753 m)   Wt 82.1 kg (181 lb)   SpO2 100%   BMI 26.73 kg/m²       EXAM: I examined pts R foot. Moderate edema of the R 2nd toe and foot to ankle level. Purple appearance of the R 2nd toe. Motor/sensory intact. Cap refill <3 secs all toes. DP pulses = BLE's    XR Results (most recent):  No results found for this or any previous visit. Assessment/Plan:     Encounter Diagnoses     ICD-10-CM ICD-9-CM   1. Toxic effect of snake venom, unintentional, initial encounter  T63.001A 989.5     E905.0     No surgical intervention needed at this time. Pt. Receiving CroFab. We will recheck in AM. Keep R foot elevated above the heart. Dr. Cortez Phelps agrees with plan.     Richardson Bacon PA-C    Orthopaedic Surgery PA

## 2020-09-03 NOTE — PROGRESS NOTES
[facility]: MED RECONCILIATION    Comments/Recommendations: Allergies and preferred pharmacy verified with patient. Patient currently takes no oral medications. Medications added:     Clindamycin gel     Medications removed:    None    Medications adjusted:    None    Information obtained from: Patient     Allergies: Crofab [crotalidae polyval immune carlos] and Ampicillin    Prior to Admission Medications:     Medication Documentation Review Audit       Reviewed by Denisse Medrano (Pharmacy Student) on 09/03/20 at 0913      Medication Sig Documenting Provider Last Dose Status Taking? clindamycin (CLINDAGEL) 1 % topical gel Apply  to affected area daily.  use thin film on affected area (face) Provider, Historical  Active Yes                    Thank you,    Bharati Pierce, PharmD Candidate [id number]

## 2020-09-03 NOTE — ED NOTES
Report given to Vida Tineo RN with critical care pt remains stable at time of transport to [facility] ED for ICU hold, pt remains on monitor with right foot elevated

## 2020-09-03 NOTE — CONSULTS
[facility]     Name: Mario Cain MRN: [id number]   : 1975 Hospital: [address]   Date: 9/3/2020        Impression Plan   1. Snakebite               · S/P crofab with concern for allergic reaction  · Monitoring coags  · Pain control  · Ortho has evaluated  · Regular diet     Radiology  ( personally reviewed) No imaging done this admission   ABG No results for input(s): PHI, PO2I, PCO2I in the last 72 hours. Subjective     Patient is a 53 y.o. male admitted for a snake bite. He was outside yesterday evening and felt a bite followed by pain to his R second toe. He had [place]head [place], but did not see what bit him. Received Crofab with concern for an allergic like reaction. Pain is better controlled this morning with meds and elevation of the foot. Has been evaluated by ortho. Review of Systems:  A comprehensive review of systems was negative except for that written in the HPI. Past Medical History:   Diagnosis Date    Kidney stones       Past Surgical History:   Procedure Laterality Date    HX HEMORRHOIDECTOMY        Prior to Admission medications    Medication Sig Start Date End Date Taking? Authorizing Provider   clindamycin (CLINDAGEL) 1 % topical gel Apply  to affected area daily. use thin film on affected area (face)   Yes Provider, Historical     Current Facility-Administered Medications   Medication Dose Route Frequency    famotidine (PF) (PEPCID) 20 mg in 0.9% sodium chloride 10 mL injection  20 mg IntraVENous Q12H    sodium chloride (NS) flush 5-40 mL  5-40 mL IntraVENous Q8H    dextrose 5 % - 0.45% NaCl infusion  75 mL/hr IntraVENous CONTINUOUS     Allergies   Allergen Reactions    Crofab [Crotalidae Polyval Immune Rambo] Anaphylaxis    Ampicillin Rash      Social History     Tobacco Use    Smoking status: Never Smoker    Smokeless tobacco: Never Used   Substance Use Topics    Alcohol use:  Yes Alcohol/week: 8.3 standard drinks     Types: 10 Cans of beer per week      History reviewed. No pertinent family history. Laboratory: I have personally reviewed the critical care flowsheet and labs. Recent Labs     09/03/20  0400 09/03/20  0134 09/02/20  2307   WBC 16.1* 11.1 12.8*   HGB 15.7 16.2 15.8   HCT 46.1 48.1 47.7    299 316     Recent Labs     09/03/20  0400 09/03/20  0134 09/02/20  2307     --  141   K 4.4  --  4.0     --  103   CO2 25  --  26   *  --  90   BUN 17  --  17   CREA 0.91  --  1.04   CA 8.6  --  9.6   ALB  --   --  4.1   ALT  --   --  25   INR  --  1.2* 1.1       Objective:     Mode Rate Tidal Volume Pressure FiO2 PEEP                    Vital Signs:     TMAX(24)      Intake/Output:   Last shift:         Last 3 shifts: No intake/output data recorded. RRIOLAST3    Intake/Output Summary (Last 24 hours) at 9/3/2020 1032  Last data filed at 9/3/2020 0655  Gross per 24 hour   Intake    Output 700 ml   Net -700 ml     EXAM:   GENERAL: well developed and in no distress, HEENT:  PERRL, EOMI, no alar flaring or epistaxis, oral mucosa moist without cyanosis, NECK:  no jugular vein distention, no retractions,LUNGS: CTAB, HEART:  Regular rate and rhythm with no MGR; no edema is present, ABDOMEN:  soft with no tenderness, bowel sounds present, EXTREMITIES:  R foot with mild edema, R 2nd toe somewhat purple, SKIN:  no jaundice or ecchymosis and NEUROLOGIC:  alert and oriented, grossly non-focal    Danny Louise MD  [facility]

## 2020-09-04 VITALS
DIASTOLIC BLOOD PRESSURE: 74 MMHG | OXYGEN SATURATION: 95 % | HEART RATE: 76 BPM | WEIGHT: 181 LBS | HEIGHT: 69 IN | BODY MASS INDEX: 26.81 KG/M2 | RESPIRATION RATE: 18 BRPM | TEMPERATURE: 97.8 F | SYSTOLIC BLOOD PRESSURE: 128 MMHG

## 2020-09-04 LAB
ALBUMIN SERPL-MCNC: 3.2 G/DL (ref 3.5–5)
ALBUMIN/GLOB SERPL: 1 {RATIO} (ref 1.1–2.2)
ALP SERPL-CCNC: 58 U/L (ref 45–117)
ALT SERPL-CCNC: 17 U/L (ref 12–78)
ANION GAP SERPL CALC-SCNC: 6 MMOL/L (ref 5–15)
APPEARANCE UR: CLEAR
AST SERPL-CCNC: 11 U/L (ref 15–37)
ATRIAL RATE: 87 BPM
BACTERIA URNS QL MICRO: NEGATIVE /HPF
BASOPHILS # BLD: 0 K/UL (ref 0–0.1)
BASOPHILS NFR BLD: 0 % (ref 0–1)
BILIRUB SERPL-MCNC: 0.5 MG/DL (ref 0.2–1)
BILIRUB UR QL: NEGATIVE
BUN SERPL-MCNC: 15 MG/DL (ref 6–20)
BUN/CREAT SERPL: 19 (ref 12–20)
CALCIUM SERPL-MCNC: 8 MG/DL (ref 8.5–10.1)
CALCULATED P AXIS, ECG09: 43 DEGREES
CALCULATED R AXIS, ECG10: -22 DEGREES
CALCULATED T AXIS, ECG11: 19 DEGREES
CHLORIDE SERPL-SCNC: 107 MMOL/L (ref 97–108)
CO2 SERPL-SCNC: 26 MMOL/L (ref 21–32)
COLOR UR: ABNORMAL
CREAT SERPL-MCNC: 0.78 MG/DL (ref 0.7–1.3)
DIAGNOSIS, 93000: NORMAL
DIFFERENTIAL METHOD BLD: ABNORMAL
EOSINOPHIL # BLD: 0.6 K/UL (ref 0–0.4)
EOSINOPHIL NFR BLD: 4 % (ref 0–7)
EPITH CASTS URNS QL MICRO: ABNORMAL /LPF
ERYTHROCYTE [DISTWIDTH] IN BLOOD BY AUTOMATED COUNT: 12.6 % (ref 11.5–14.5)
GLOBULIN SER CALC-MCNC: 3.3 G/DL (ref 2–4)
GLUCOSE SERPL-MCNC: 99 MG/DL (ref 65–100)
GLUCOSE UR STRIP.AUTO-MCNC: NEGATIVE MG/DL
HCT VFR BLD AUTO: 41.6 % (ref 36.6–50.3)
HGB BLD-MCNC: 14 G/DL (ref 12.1–17)
HGB UR QL STRIP: ABNORMAL
HYALINE CASTS URNS QL MICRO: ABNORMAL /LPF (ref 0–5)
IMM GRANULOCYTES # BLD AUTO: 0.1 K/UL (ref 0–0.04)
IMM GRANULOCYTES NFR BLD AUTO: 1 % (ref 0–0.5)
INR PPP: 1.2 (ref 0.9–1.1)
KETONES UR QL STRIP.AUTO: NEGATIVE MG/DL
LEUKOCYTE ESTERASE UR QL STRIP.AUTO: NEGATIVE
LYMPHOCYTES # BLD: 2.3 K/UL (ref 0.8–3.5)
LYMPHOCYTES NFR BLD: 16 % (ref 12–49)
MCH RBC QN AUTO: 31.9 PG (ref 26–34)
MCHC RBC AUTO-ENTMCNC: 33.7 G/DL (ref 30–36.5)
MCV RBC AUTO: 94.8 FL (ref 80–99)
MONOCYTES # BLD: 1 K/UL (ref 0–1)
MONOCYTES NFR BLD: 7 % (ref 5–13)
NEUTS SEG # BLD: 9.9 K/UL (ref 1.8–8)
NEUTS SEG NFR BLD: 72 % (ref 32–75)
NITRITE UR QL STRIP.AUTO: NEGATIVE
NRBC # BLD: 0 K/UL (ref 0–0.01)
NRBC BLD-RTO: 0 PER 100 WBC
P-R INTERVAL, ECG05: 142 MS
PH UR STRIP: 6.5 [PH] (ref 5–8)
PLATELET # BLD AUTO: 233 K/UL (ref 150–400)
PMV BLD AUTO: 10.1 FL (ref 8.9–12.9)
POTASSIUM SERPL-SCNC: 3.4 MMOL/L (ref 3.5–5.1)
PROT SERPL-MCNC: 6.5 G/DL (ref 6.4–8.2)
PROT UR STRIP-MCNC: NEGATIVE MG/DL
PROTHROMBIN TIME: 11.9 SEC (ref 9–11.1)
Q-T INTERVAL, ECG07: 364 MS
QRS DURATION, ECG06: 94 MS
QTC CALCULATION (BEZET), ECG08: 438 MS
RBC # BLD AUTO: 4.39 M/UL (ref 4.1–5.7)
RBC #/AREA URNS HPF: ABNORMAL /HPF (ref 0–5)
SODIUM SERPL-SCNC: 139 MMOL/L (ref 136–145)
SP GR UR REFRACTOMETRY: 1.01 (ref 1–1.03)
UA: UC IF INDICATED,UAUC: ABNORMAL
UROBILINOGEN UR QL STRIP.AUTO: 0.2 EU/DL (ref 0.2–1)
VENTRICULAR RATE, ECG03: 87 BPM
WBC # BLD AUTO: 13.9 K/UL (ref 4.1–11.1)
WBC URNS QL MICRO: ABNORMAL /HPF (ref 0–4)

## 2020-09-04 PROCEDURE — 85610 PROTHROMBIN TIME: CPT

## 2020-09-04 PROCEDURE — 90686 IIV4 VACC NO PRSV 0.5 ML IM: CPT | Performed by: INTERNAL MEDICINE

## 2020-09-04 PROCEDURE — 74011250636 HC RX REV CODE- 250/636: Performed by: INTERNAL MEDICINE

## 2020-09-04 PROCEDURE — 90471 IMMUNIZATION ADMIN: CPT

## 2020-09-04 PROCEDURE — 97116 GAIT TRAINING THERAPY: CPT | Performed by: PHYSICAL THERAPIST

## 2020-09-04 PROCEDURE — 96375 TX/PRO/DX INJ NEW DRUG ADDON: CPT

## 2020-09-04 PROCEDURE — 36415 COLL VENOUS BLD VENIPUNCTURE: CPT

## 2020-09-04 PROCEDURE — 85025 COMPLETE CBC W/AUTO DIFF WBC: CPT

## 2020-09-04 PROCEDURE — 81001 URINALYSIS AUTO W/SCOPE: CPT

## 2020-09-04 PROCEDURE — L4360 PNEUMAT WALKING BOOT PRE CST: HCPCS

## 2020-09-04 PROCEDURE — 97161 PT EVAL LOW COMPLEX 20 MIN: CPT | Performed by: PHYSICAL THERAPIST

## 2020-09-04 PROCEDURE — 80053 COMPREHEN METABOLIC PANEL: CPT

## 2020-09-04 PROCEDURE — 74011250637 HC RX REV CODE- 250/637: Performed by: INTERNAL MEDICINE

## 2020-09-04 RX ORDER — POTASSIUM CHLORIDE 750 MG/1
40 TABLET, FILM COATED, EXTENDED RELEASE ORAL
Status: COMPLETED | OUTPATIENT
Start: 2020-09-04 | End: 2020-09-04

## 2020-09-04 RX ADMIN — INFLUENZA VIRUS VACCINE 0.5 ML: 15; 15; 15; 15 SUSPENSION INTRAMUSCULAR at 13:59

## 2020-09-04 RX ADMIN — POTASSIUM CHLORIDE 40 MEQ: 750 TABLET, FILM COATED, EXTENDED RELEASE ORAL at 13:58

## 2020-09-04 RX ADMIN — Medication 10 ML: at 06:48

## 2020-09-04 RX ADMIN — Medication 10 ML: at 00:00

## 2020-09-04 NOTE — PROGRESS NOTES
orquidea TLC from Rukhsana Bryant RN from poison control at [facility]. I printed off DC instructions and also informed her they were waiting for PT to see to fit him for a boot. SHe was concerned that there should not be a boot or anything that compresses the snake bite. Reached out to [facility] and he stated the boor does not compress the bite and that is more for stabilization of the ankle when ambulatory. The other times he is not walking it will be off. Will relay this information to the patient.

## 2020-09-04 NOTE — DISCHARGE SUMMARY
Physician Discharge Summary     Patient ID:  Fran Delacruz  [id number]  85 y.o.  1975    Admit date: 9/2/2020    Discharge date: 9/4/2020    Admission Diagnoses: Snake bite [W59.11XA]  Snake bite [W59.11XA]  Snake bite [W59.11XA]    Principal Discharge Diagnoses:    Snake bite    OTHER PROBLEMS ADDRESSEDS  Principal Diagnosis <principal problem not specified>                                            Active Problems:    Snake bite (9/3/2020)       Patient Active Problem List   Diagnosis Code    Snake bite W59. [address] Course:   Mr. Emelyn Fernandes is a 54 yo M w/ hx of kidney stones admitted for snake bite. Pt was given anti-venom, to which he developed an allergic reaction. Based on the recommendations of Poison Control, pt was monitored overnight and he continued to improve. He did have some urinary symptoms and UA showed trace hematuria. Pt does have a hx of kidney stones and was advised to stay well hydrated and to follow up with urology or a PCP      Pt discharged in improved and stable condition. Procedures performed: see above    Imaging studies: none      PCP: None    Consults: Orthopedic Surgery    Discharge Exam:    Patient Vitals for the past 24 hrs:   Temp Pulse Resp BP SpO2   09/04/20 1137 97.8 °F (36.6 °C) 76 18 128/74 95 %   09/04/20 0802 97.7 °F (36.5 °C) 72 18 117/65 97 %   09/04/20 0420 97.8 °F (36.6 °C) 86 17 105/61 97 %     GEN: NAD  CV: RRR  RESP: CTAB  ABD: NTTP  SKIN: R foot mild swelling. No redness. Improving     Disposition: home    Patient Instructions:   Current Discharge Medication List      CONTINUE these medications which have NOT CHANGED    Details   clindamycin (CLINDAGEL) 1 % topical gel Apply  to affected area daily.  use thin film on affected area (face)             Activity: See discharge instructions  Diet: See discharge instructions  Wound Care: See discharge instructions    Follow-up Information     Follow up With Specialties Details Why Contact Info Follow up with a primary care physician        Adrienne Woodward MD Orthopedic Surgery  As needed, If symptoms worsen [address]  [phone]      None    None (207) Patient stated that they have no PCP            I spent 35 minutes on this discharge.     Signed:  Natalia Craft MD  9/4/2020  9:18 AM

## 2020-09-04 NOTE — DISCHARGE INSTRUCTIONS
HOSPITALIST DISCHARGE INSTRUCTIONS  NAME: Babak AU:  1975   MRN:  [id number]     Date/Time:  2020 9:17 AM    ADMIT DATE: 2020     DISCHARGE DATE: 2020     PRINCIPAL DISCHARGE DIAGNOSES:  Snake bite  Allergic reaction to anti-venom    MEDICATIONS:  · It is important that you take the medication exactly as they are prescribed. Note the changes and additions to your medications. Be sure you understand these changes before you are discharged today. · Keep your medication in the bottles provided by the pharmacist and keep a list of the medication names, dosages, and times to be taken in your wallet. · Do not take other medications without consulting your doctor. Pain Management: per above medications    What to do at Home    Recommended diet:  Resume previous diet    Recommended activity:   Walker boot when out of bed, Crutch ambulation. Weight bearing as tolerated. Elevate foot above heart as much as possible    If you experience any of the following symptoms then please call your primary care physician or return to the emergency room if you cannot get hold of your doctor:  Fever, chills, worsening foot/leg redness, swelling, pain, tenderness, severe abdominal pain, nausea, vomiting, diarrhea, confusion, weakness, bleeding, severe chest pain, shortness of breath, or other severe concerning symptoms    NetMinder Activation    Thank you for requesting access to NetMinder. Please follow the instructions below to securely access and download your online medical record. NetMinder allows you to send messages to your doctor, view your test results, renew your prescriptions, schedule appointments, and more. How Do I Sign Up? 1. In your internet browser, go to [email or web address]  2. Click on the First Time User? Click Here link in the Sign In box. You will be redirect to the New Member Sign Up page. 3. Enter your NetMinder Access Code exactly as it appears below.  You will not need to use this code after youve completed the sign-up process. If you do not sign up before the expiration date, you must request a new code. Buzzoo Access Code: [email or web address]  Expires: 10/17/2020 10:51 PM (This is the date your Buzzoo access code will )    4. Enter the last four digits of your Social Security Number (xxxx) and Date of Birth (mm/dd/yyyy) as indicated and click Submit. You will be taken to the next sign-up page. 5. Create a Tinselvisiont ID. This will be your Buzzoo login ID and cannot be changed, so think of one that is secure and easy to remember. 6. Create a Buzzoo password. You can change your password at any time. 7. Enter your Password Reset Question and Answer. This can be used at a later time if you forget your password. 8. Enter your e-mail address. You will receive e-mail notification when new information is available in [address] 9. Click Sign Up. You can now view and download portions of your medical record. 10. Click the Download Summary menu link to download a portable copy of your medical information. Additional Information    If you have questions, please visit the Frequently Asked Questions section of the Buzzoo website at [email or web address] Remember, Buzzoo is NOT to be used for urgent needs. For medical emergencies, dial 911. Follow Up: Follow-up Information     Follow up With Specialties Details Why Contact Info    Follow up with a primary care physician        Rohan Roa MD Orthopedic Surgery  As needed, If symptoms worsen [address] [facility]  [address]  [address] [facility]  [phone]              Information obtained by :  I understand that if any problems occur once I am at home I am to contact my physician. I understand and acknowledge receipt of the instructions indicated above. Physician's or R.N.'s Signature                                                                  Date/Time                                                                                                                                              Patient or Representative Signature                                                          Date/Time

## 2020-09-04 NOTE — PROGRESS NOTES
Bedside and Verbal shift change report given to Al Henson RN (oncoming nurse) by Willam Crespo RN (offgoing nurse). Report included the following information SBAR, Kardex, Accordion and Recent Results.

## 2020-09-04 NOTE — PROGRESS NOTES
Ortho Progress Note        S:  Sitting up in bed. Pain in RLE controlled at rest.    O:  Pain and moderate edema to R 2nd toe and R ankle. No erythema. Cap refill brisk all toes. Edema much improved. Motor/sensory intact distally all toes. Pain with ROM of the toes and ankle. A:  Venomous snake bite R 2nd toe    P:  Walker boot when out of bed, Crutch ambulation. WBAT. Elevate above heart as much as possible. F/U with Dr. Murray Scriver next week as needed. Signing off, reconsult as needed. Dr. Terri Schaffer agrees with plan. Thank you for allowing us to take part in this patients care.     Vivi Beauchamp PA-C  Orthopaedic Surgery PA

## 2020-09-04 NOTE — PROGRESS NOTES
Attempted to schedule hospital follow up PCP appointment. Per Care Management, patient prefers to schedule his own appointment. Pending patient discharge.   Angel Underwood, Care Management Specialist.

## 2020-09-04 NOTE — PROGRESS NOTES
All DC instructions reviewed with patient. There were no scripts to be given. Discussed FU appt and if needed to call Ortho if it gets worse. PIV dc'd. Pt left via WC with SO and volunteer. Reviewed Dc instructions about snake bites. Pt has crutches at home.

## 2020-09-04 NOTE — PROGRESS NOTES
Transitional Plan of Care:  RUR-8%  1. PT to evaluate pt with walking boot and crutch (has crutches at home)  2. Pt's wife to transport him home at d/c  3. CM following for any needs prior to d/c  BONY Jasso

## 2020-09-04 NOTE — PROGRESS NOTES
PHYSICAL THERAPY EVALUATION/DISCHARGE  Patient: Yanet Brown (3 y.o. male)  Date: 9/4/2020  Primary Diagnosis: Snake bite [W59.11XA]  Snake bite [W59.11XA]  Snake bite [W59.11XA]        Precautions: fall; cardiac         ASSESSMENT  Based on the objective data described below, the patient presents with limited mobility and pain following snake bite. Patient trained in use of crutches on level ground and stairs. Order is for WBAT but patient declined to learn this gait pattern due to the extent of his pain (4 in bed & 8 while amb), and amb NWB R during today's training. He was able to don boot with some cueing and cautionary advice. He appeared impulsive with difficulty taking the time to listen to instructions and safety precautions. Advised to 1) remain off foot & elevate above heart level whenever possible 2) use boot only when ambulating 3) adjust boot to remain in place but not tight, and to avoid use of air compression mechanism in boot 4) watch sensation and seek medical attention if this changes. Again emphasized he should slow down to prevent potential falls & further injury. Functional Outcome Measure: The patient scored 85 on the barthel outcome measure which is indicative of 85% indep in ADL. Other factors to consider for discharge: wife lives with patient in one story home with 2 steps & railing to enter     Further skilled acute physical therapy is not indicated at this time. PLAN :  Recommendation for discharge: (in order for the patient to meet his/her long term goals)  No skilled physical therapy/ follow up rehabilitation needs identified at this time. This discharge recommendation:  Has been made in collaboration with the attending provider and/or case management    IF patient discharges home will need the following DME: none - owns crutches       SUBJECTIVE:   Patient stated I'm all right. ... I'm all right.   When advised to slow down, get his balance, etc    OBJECTIVE DATA SUMMARY:   HISTORY:    Past Medical History:   Diagnosis Date    Kidney stones      Past Surgical History:   Procedure Laterality Date    HX HEMORRHOIDECTOMY         Prior level of function: indep & active  Personal factors and/or comorbidities impacting plan of care:          EXAMINATION/PRESENTATION/DECISION MAKING:   Critical Behavior:  Neurologic State: Alert, Appropriate for age  Orientation Level: Oriented X4  Cognition: Follows commands     Hearing:     Skin:  apparent bite  Edema: significant RLE distal to knee  Range Of Motion:  AROM: Within functional limits                       Strength:    Strength: Within functional limits                    Tone & Sensation:   Tone: Normal              Sensation: Intact               Coordination:  Coordination: Within functional limits  Vision:      Functional Mobility:  Bed Mobility:     Supine to Sit: Independent  Sit to Supine: Independent  Scooting: Independent  Transfers:  Sit to Stand: Minimum assistance  Stand to Sit: Contact guard assistance                       Balance:   Sitting: Intact  Standing: Intact; With support  Ambulation/Gait Training:  Distance (ft): 80 Feet (ft)  Assistive Device: Crutches  Ambulation - Level of Assistance: Contact guard assistance     Gait Description (WDL): Exceptions to WDL     Right Side Weight Bearing: As tolerated                                    Stairs:  Number of Stairs Trained: 4  Stairs - Level of Assistance: Contact guard assistance   Rail Use: Right       Functional Measure:  Barthel Index:    Bathin  Bladder: 10  Bowels: 10  Groomin  Dressing: 10  Feeding: 10  Mobility: 0  Stairs: 10  Toilet Use: 10  Transfer (Bed to Chair and Back): 15  Total: 85/100       The Barthel ADL Index: Guidelines  1. The index should be used as a record of what a patient does, not as a record of what a patient could do.   2. The main aim is to establish degree of independence from any help, physical or verbal, however minor and for whatever reason. 3. The need for supervision renders the patient not independent. 4. A patient's performance should be established using the best available evidence. Asking the patient, friends/relatives and nurses are the usual sources, but direct observation and common sense are also important. However direct testing is not needed. 5. Usually the patient's performance over the preceding 24-48 hours is important, but occasionally longer periods will be relevant. 6. Middle categories imply that the patient supplies over 50 per cent of the effort. 7. Use of aids to be independent is allowed. Hali Montaño., Barthel, DGAURANG. (0811). Functional evaluation: the Barthel Index. [address] [facility] (14)2. Sima Bacon darlene BIRD Henson, Bridget Mo., Selwyn Simpson., [place], [address] [place] [address] Ave (). Measuring the change indisability after inpatient rehabilitation; comparison of the responsiveness of the Barthel Index and Functional Bowman Measure. Journal of Neurology, Neurosurgery, and Psychiatry, 66(4), 696-399. Rafia Thomson N.J.A, YASH Isidro, & Fred Wen MMOISÉS. (2004.) Assessment of post-stroke quality of life in cost-effectiveness studies: The usefulness of the Barthel Index and the EuroQoL-5D.  Quality of Life Research, 15, 345-18           Physical Therapy Evaluation Charge Determination   History Examination Presentation Decision-Making   LOW Complexity : Zero comorbidities / personal factors that will impact the outcome / POC LOW Complexity : 1-2 Standardized tests and measures addressing body structure, function, activity limitation and / or participation in recreation  LOW Complexity : Stable, uncomplicated  Other outcome measures barthel  LOW       Based on the above components, the patient evaluation is determined to be of the following complexity level: LOW     Pain Ratin-8    Activity Tolerance:   Good  Please refer to the flowsheet for vital signs taken during this treatment. After treatment patient left in no apparent distress:   Supine in bed    COMMUNICATION/EDUCATION:   The patients plan of care was discussed with: Registered nurse and Case management. Fall prevention education was provided and the patient/caregiver indicated understanding. and Patient/family have participated as able in goal setting and plan of care.     Thank you for this referral.  Frances Beauchamp, PT   Time Calculation: 22 mins